# Patient Record
Sex: MALE | Race: BLACK OR AFRICAN AMERICAN | NOT HISPANIC OR LATINO | Employment: FULL TIME | URBAN - METROPOLITAN AREA
[De-identification: names, ages, dates, MRNs, and addresses within clinical notes are randomized per-mention and may not be internally consistent; named-entity substitution may affect disease eponyms.]

---

## 2017-01-06 ENCOUNTER — OFFICE VISIT (OUTPATIENT)
Dept: ORTHOPEDICS | Facility: CLINIC | Age: 32
End: 2017-01-06

## 2017-01-06 VITALS — BODY MASS INDEX: 22.69 KG/M2 | WEIGHT: 162.1 LBS | HEIGHT: 71 IN

## 2017-01-06 DIAGNOSIS — M79.89 SOFT TISSUE MASS: Primary | ICD-10-CM

## 2017-01-06 NOTE — NURSING NOTE
"Reason For Visit:   Chief Complaint   Patient presents with     RECHECK     Pt. states that he is here today for Left Foot Pain. HX: Left Foot Mass Removal in 2013 in [place]                       HEIGHT: 5' 10.5\", WEIGHT: 162 lbs 1.6 oz, BMI: Body mass index is 22.92 kg/(m^2).      No current outpatient prescriptions on file.        No Known Allergies      "

## 2017-01-06 NOTE — PROGRESS NOTES
I was present with the PA during the history and exam.  I discussed the case with the PA and agree with the findings as documented in the assessment and plan.

## 2017-01-06 NOTE — Clinical Note
1/6/2017       RE: Roberto Kay  [address] [address]     Dear Colleague,    Thank you for referring your patient, Roberto Kay, to the [facility] at [facility]. Please see a copy of my visit note below.    I was present with the PA during the history and exam.  I discussed the case with the PA and agree with the findings as documented in the assessment and plan.    CHIEF COMPLAINT:  Left foot pain.      HISTORY OF PRESENT ILLNESS:  Roberto is a 32-year-old male with a history of plantar fibroma.  This was resected once in [place] several years ago and has re-grown back.  He recently met with Dr. Reese in November, 2016 to discuss possible removal.  It was decided he was going to wait and see how he did.  He started having increasing pain and swelling after that visit and developed an infection in the area of the plantar fibroma.  He was seen at another facility and placed on oral antibiotics for 2 weeks.  He states this got rid of his pain and the slight drainage he was having from the wound on the plantar fibroma.  The foot felt very good for several weeks and now he is getting some pain again.  It mostly bothers him at the end of the day.  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  He does not wear any special inserts in his shoes.  He is inquiring about further antibiotics.  He denies any recurrence of redness or drainage from the foot.  No other concerns.      PHYSICAL EXAMINATION:  Roberto is a healthy-appearing 32-year-old  male who is alert and oriented, in no apparent distress.  He is pleasant and cooperative with the exam.  Upon ambulation, he has a nonantalgic reciprocal gait today.  He has a 2 cm plantar fibroma on the plantar surface of the left foot.  There is a small 0.5 cm eschar or blood blister at the proximal end of the fibroma from the previous  infection.  There is no active drainage.  There is no erythema.  He does have some tenderness to deep palpation of the fibroma.  He has full motion of his foot.  He is neurovascularly intact distally.  He has minimal swelling of plantar fibroma today.      IMPRESSION:   1.  A 32-year-old male with left foot plantar fibroma.   2.  Recent infection of his left foot, now resolving.      PLAN:  Roberto seems to be improving from his recent foot infection.  We would not recommend any further antibiotics at this time.  He can participate in activities as tolerated.  If he is feeling throbbing or discomfort at the end of the day, we recommend elevation and rest.  He can also try ibuprofen as needed for his pain and swelling.  We would not recommend surgical excision at this time due to increased risk of infection.  He should wait about a month and see if his symptoms continue to improve.  If at that point he is still having quite a bit of pain, then Dr. Reese would be fine with surgical excision of the plantar fibroma.  Of course, there is a chance that it could recur again, as the patient is well aware of.  The patient agrees with the plan of care.  Dr. Reese examined the patient as well.  All questions were answered.           Again, thank you for allowing me to participate in the care of your patient.      Sincerely,    Adam Reese MD

## 2017-01-06 NOTE — PROGRESS NOTES
CHIEF COMPLAINT:  Left foot pain.      HISTORY OF PRESENT ILLNESS:  Roberto is a 32-year-old male with a history of plantar fibroma.  This was resected once in [place] several years ago and has re-grown back.  He recently met with Dr. Reese in November, 2016 to discuss possible removal.  It was decided he was going to wait and see how he did.  He started having increasing pain and swelling after that visit and developed an infection in the area of the plantar fibroma.  He was seen at another facility and placed on oral antibiotics for 2 weeks.  He states this got rid of his pain and the slight drainage he was having from the wound on the plantar fibroma.  The foot felt very good for several weeks and now he is getting some pain again.  It mostly bothers him at the end of the day.  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  He does not wear any special inserts in his shoes.  He is inquiring about further antibiotics.  He denies any recurrence of redness or drainage from the foot.  No other concerns.      PHYSICAL EXAMINATION:  Roberto is a healthy-appearing 32-year-old  male who is alert and oriented, in no apparent distress.  He is pleasant and cooperative with the exam.  Upon ambulation, he has a nonantalgic reciprocal gait today.  He has a 2 cm plantar fibroma on the plantar surface of the left foot.  There is a small 0.5 cm eschar or blood blister at the proximal end of the fibroma from the previous infection.  There is no active drainage.  There is no erythema.  He does have some tenderness to deep palpation of the fibroma.  He has full motion of his foot.  He is neurovascularly intact distally.  He has minimal swelling of plantar fibroma today.      IMPRESSION:   1.  A 32-year-old male with left foot plantar fibroma.   2.  Recent infection of his left foot, now resolving.      PLAN:  Roberto seems to be improving from his recent foot infection.  We  would not recommend any further antibiotics at this time.  He can participate in activities as tolerated.  If he is feeling throbbing or discomfort at the end of the day, we recommend elevation and rest.  He can also try ibuprofen as needed for his pain and swelling.  We would not recommend surgical excision at this time due to increased risk of infection.  He should wait about a month and see if his symptoms continue to improve.  If at that point he is still having quite a bit of pain, then Dr. Reese would be fine with surgical excision of the plantar fibroma.  Of course, there is a chance that it could recur again, as the patient is well aware of.  The patient agrees with the plan of care.  Dr. Reese examined the patient as well.  All questions were answered.

## 2023-03-23 ENCOUNTER — APPOINTMENT (OUTPATIENT)
Dept: GENERAL RADIOLOGY | Facility: CLINIC | Age: 38
End: 2023-03-23
Attending: EMERGENCY MEDICINE
Payer: COMMERCIAL

## 2023-03-23 ENCOUNTER — HOSPITAL ENCOUNTER (EMERGENCY)
Facility: CLINIC | Age: 38
Discharge: HOME | End: 2023-03-23
Attending: EMERGENCY MEDICINE | Admitting: EMERGENCY MEDICINE
Payer: COMMERCIAL

## 2023-03-23 VITALS
RESPIRATION RATE: 18 BRPM | SYSTOLIC BLOOD PRESSURE: 119 MMHG | TEMPERATURE: 97.7 F | OXYGEN SATURATION: 98 % | HEART RATE: 80 BPM | HEIGHT: 71 IN | BODY MASS INDEX: 26.02 KG/M2 | DIASTOLIC BLOOD PRESSURE: 80 MMHG | WEIGHT: 185.9 LBS

## 2023-03-23 DIAGNOSIS — S52.124A CLOSED NONDISPLACED FRACTURE OF HEAD OF RIGHT RADIUS, INITIAL ENCOUNTER: Primary | ICD-10-CM

## 2023-03-23 PROCEDURE — 73080 X-RAY EXAM OF ELBOW: CPT | Mod: RT

## 2023-03-23 PROCEDURE — 99285 EMERGENCY DEPT VISIT HI MDM: CPT | Mod: 25 | Performed by: EMERGENCY MEDICINE

## 2023-03-23 PROCEDURE — 73110 X-RAY EXAM OF WRIST: CPT | Mod: RT

## 2023-03-23 PROCEDURE — 24650 CLTX RDL HEAD/NCK FX WO MNPJ: CPT | Mod: 54 | Performed by: EMERGENCY MEDICINE

## 2023-03-23 PROCEDURE — 99284 EMERGENCY DEPT VISIT MOD MDM: CPT | Mod: 57 | Performed by: EMERGENCY MEDICINE

## 2023-03-23 PROCEDURE — 24650 CLTX RDL HEAD/NCK FX WO MNPJ: CPT | Mod: RT | Performed by: EMERGENCY MEDICINE

## 2023-03-23 PROCEDURE — 73090 X-RAY EXAM OF FOREARM: CPT | Mod: RT

## 2023-03-23 RX ORDER — NAPROXEN 500 MG/1
500 TABLET ORAL 2 TIMES DAILY WITH MEALS
Qty: 30 TABLET | Refills: 0 | Status: SHIPPED | OUTPATIENT
Start: 2023-03-23 | End: 2023-04-07

## 2023-03-23 RX ORDER — IBUPROFEN 600 MG/1
600 TABLET, FILM COATED ORAL ONCE
Status: COMPLETED | OUTPATIENT
Start: 2023-03-23 | End: 2023-03-23

## 2023-03-23 ASSESSMENT — ACTIVITIES OF DAILY LIVING (ADL): ADLS_ACUITY_SCORE: 33

## 2023-03-23 NOTE — DISCHARGE INSTRUCTIONS
A referral for orthopedic surgery has been made for you.  They will contact you to set up an appointment.

## 2023-03-23 NOTE — ED PROVIDER NOTES
"    [facility] EMERGENCY DEPARTMENT ([facility])     March 23, 2023  ED Provider Note  [facility]      History     Chief Complaint   Patient presents with     Fall     Patient had a fall while walking yesterday, and hit the curb. His right elbow and wrist hurts. Denies hitting his head. No loss of consciousness.      Arm Injury     HPI  Roberto Kay is a 38 year old male who presents to the Emergency Department for evaluation of fall. Patient reports right elbow and wrist pain after falling yesterday while walking on the curb. He state he finds it difficult to move his arm. No other complaints.     Past Medical History  No past medical history on file.  No past surgical history on file.  naproxen (NAPROSYN) 500 MG tablet      No Known Allergies  Family History  No family history on file.  Social History   Social History     Tobacco Use     Smoking status: Never   Substance Use Topics     Alcohol use: No     Alcohol/week: 0.0 standard drinks     Drug use: No      Past medical history, past surgical history, medications, allergies, family history, and social history were reviewed with the patient. No additional pertinent items.      A medically appropriate review of systems was performed with pertinent positives and negatives noted in the HPI, and all other systems negative.    Physical Exam   BP: 119/80  Pulse: 80  Temp: 97.7  F (36.5  C)  Resp: 18  Height: 181 cm (5' 11.26\")  Weight: 84.3 kg (185 lb 14.4 oz)  SpO2: 98 %  Physical Exam  Vitals and nursing note reviewed.   Constitutional:       General: He is not in acute distress.     Appearance: He is well-developed.   HENT:      Head: Normocephalic.      Right Ear: External ear normal.      Left Ear: External ear normal.      Nose: Nose normal.   Eyes:      Extraocular Movements: Extraocular movements intact.      Conjunctiva/sclera: Conjunctivae normal.   Pulmonary:      Effort: Pulmonary effort is normal. No respiratory " distress.   Abdominal:      General: Abdomen is flat. There is no distension.   Musculoskeletal:         General: No deformity. Normal range of motion.      Cervical back: Normal range of motion and neck supple.      Comments: Tenderness in the area of the right radial head as well as right olecranon.  Limited range of motion elbow flexion extension as well as supination pronation.   Skin:     General: Skin is warm and dry.   Neurological:      Mental Status: He is alert. Mental status is at baseline.      Comments: Oriented   Psychiatric:         Mood and Affect: Mood normal.         Behavior: Behavior normal.           ED Course, Procedures, & Data      Procedures        12:36 PM  The patient was seen and examined by Roni Brannon DO in Room [facility].                 Results for orders placed or performed during the hospital encounter of 03/23/23   XR Wrist Right 3 Views     Status: None    Narrative    Examination:  XR WRIST RIGHT G/E 3 VIEWS    Date:  3/23/2023 1:41 PM     Clinical Information: Fall, pain    Comparison: none.      Impression    Impression:    1.  Right wrist negative for fracture or dislocation.     2.  The wrist is positioned in pronation, resulting in alignment of  the proximal ulna involving the extreme posterior margin of the distal  radius at the DRUJ. If the patient is focally symptomatic in this area  (such as with DRUJ instability or symptomatology), CT could confirm  joint alignment is within normal limits.    KATIE VAUGHAN MD         SYSTEM ID:  [id number]   Radius/Ulna XR, PA & LAT, right     Status: None    Narrative    Examination:  XR FOREARM RIGHT 2 VIEWS    Date:  3/23/2023 1:44 PM     Clinical Information: fall, pain    Comparison: none.      Impression    Impression:    1.  Acute fractures of the proximal radius with intra-articular  extension to the elbow joint. No significant bony deformity. Large  elbow joint effusion. Elbow joint alignment is normal.    2.   Otherwise normal right forearm and wrist.    KATIE VAUGHAN MD         SYSTEM ID:  [id number]   XR Elbow Right G/E 3 Views     Status: None    Narrative    Examination:  XR ELBOW RIGHT G/E 3 VIEWS    Date:  3/23/2023 1:43 PM     Clinical Information: Fall, pain    Comparison: none.      Impression    Impression:    1.  Acute nondisplaced fracture through the radial head, without  significant bony deformity or step-off.    2.  Large elbow joint effusion.     3.  Normal joint alignment.    KATIE VAUGHAN MD         SYSTEM ID:  [id number]     Medications   ibuprofen (ADVIL/MOTRIN) tablet 600 mg (600 mg Oral Not Given 3/23/23 1254)     Labs Ordered and Resulted from Time of ED Arrival to Time of ED Departure - No data to display  Radius/Ulna XR, PA & LAT, right   Final Result   Impression:      1.  Acute fractures of the proximal radius with intra-articular   extension to the elbow joint. No significant bony deformity. Large   elbow joint effusion. Elbow joint alignment is normal.      2.  Otherwise normal right forearm and wrist.      KATIE VAUGHAN MD            SYSTEM ID:  [id number]      XR Elbow Right G/E 3 Views   Final Result   Impression:      1.  Acute nondisplaced fracture through the radial head, without   significant bony deformity or step-off.      2.  Large elbow joint effusion.       3.  Normal joint alignment.      KATIE VAUGHAN MD            SYSTEM ID:  [id number]      XR Wrist Right 3 Views   Final Result   Impression:      1.  Right wrist negative for fracture or dislocation.       2.  The wrist is positioned in pronation, resulting in alignment of   the proximal ulna involving the extreme posterior margin of the distal   radius at the DRUJ. If the patient is focally symptomatic in this area   (such as with DRUJ instability or symptomatology), CT could confirm   joint alignment is within normal limits.      KATIE VAUGHAN MD            SYSTEM ID:  [id number]              Medical Decision Making  The patient's presentation is strongly suggestive of moderate complexity (an acute complicated injury).    The patient's evaluation involved:  review of external note(s) from 3+ sources (Most recent H&P in addition to clinic and ED note)  Reviewed x-ray results here  Independently interpreted x-rays done today    The patient's management involved moderate risk (prescription drug management including medications given in the ED).      Assessment & Plan    38-year-old male presents to us with a chief complaint of elbow and wrist pain.  He does have a radial head fracture apparent on x-ray.  We will give him pain medication as well as place him in a sling for for 2 to 3 days.  He is instructed to remove it after that.  Referral for orthopedic surgery has been made for him.  He will follow-up with them.    I have reviewed the nursing notes. I have reviewed the findings, diagnosis, plan and need for follow up with the patient.    Discharge Medication List as of 3/23/2023  2:51 PM      START taking these medications    Details   naproxen (NAPROSYN) 500 MG tablet Take 1 tablet (500 mg) by mouth 2 times daily (with meals) for 15 days, Disp-30 tablet, R-0, E-Prescribe             Final diagnoses:   Closed nondisplaced fracture of head of right radius, initial encounter   I, Rashida Hopkins, am serving as a trained medical scribe to document services personally performed by  Roni Brannon DO, based on the provider's statements to me.      IRoni DO, was physically present and have reviewed and verified the accuracy of this note documented by Rashida Hopkins.     Roni Brannon DO  formerly [facility] EMERGENCY DEPARTMENT  3/23/2023     Roni Brannon DO  03/23/23 [id number]

## 2023-03-23 NOTE — ED TRIAGE NOTES
Patient states to have fallen yesterday while walking on the curb, hurt his right elbow and wrist, difficulty moving his arm.      Triage Assessment     Row Name 03/23/23 1217       Triage Assessment (Adult)    Airway WDL WDL       Respiratory WDL    Respiratory WDL WDL       Skin Circulation/Temperature WDL    Skin Circulation/Temperature WDL WDL       Cardiac WDL    Cardiac WDL WDL       Peripheral/Neurovascular WDL    Peripheral Neurovascular WDL WDL       Cognitive/Neuro/Behavioral WDL    Cognitive/Neuro/Behavioral WDL WDL

## 2023-03-24 NOTE — TELEPHONE ENCOUNTER
DIAGNOSIS: Closed nondisplaced fracture of head of right radius   APPOINTMENT DATE: 03/27/2023   NOTES STATUS DETAILS   OFFICE NOTE from referring provider N/A    OFFICE NOTE from other specialist N/A    DISCHARGE SUMMARY from hospital N/A    DISCHARGE REPORT from the ER Internal 03/23/2023 [facility] ED    OPERATIVE REPORT N/A    EMG report N/A    MEDICATION LIST N/A    MRI N/A    DEXA (osteoporosis/bone health) N/A    CT SCAN N/A    XRAYS (IMAGES & REPORTS) Internal 03/23/2023 RT wrist, elbow forearm

## 2023-03-27 ENCOUNTER — OFFICE VISIT (OUTPATIENT)
Dept: ORTHOPEDICS | Facility: CLINIC | Age: 38
End: 2023-03-27
Attending: EMERGENCY MEDICINE
Payer: COMMERCIAL

## 2023-03-27 ENCOUNTER — PRE VISIT (OUTPATIENT)
Dept: ORTHOPEDICS | Facility: CLINIC | Age: 38
End: 2023-03-27

## 2023-03-27 DIAGNOSIS — S52.124A CLOSED NONDISPLACED FRACTURE OF HEAD OF RIGHT RADIUS, INITIAL ENCOUNTER: ICD-10-CM

## 2023-03-27 PROCEDURE — 99203 OFFICE O/P NEW LOW 30 MIN: CPT | Performed by: FAMILY MEDICINE

## 2023-03-27 ASSESSMENT — PAIN SCALES - GENERAL: PAINLEVEL: MODERATE PAIN (4)

## 2023-03-27 NOTE — LETTER
3/27/2023         RE: Roberto Kay  [id number]  [facility] [place] [address]        Dear Colleague,    Thank you for referring your patient, Roberto Kay, to the [facility] SPORTS MEDICINE CLINIC [place]. Please see a copy of my visit note below.    CHIEF COMPLAINT:  New Patient and Pain of the Right Forearm       HISTORY OF PRESENT ILLNESS  Mr. Kay is a pleasant 38 year old male who presents to clinic today with a right elbow injury.  Today's visit is conducted with assistance of a [facility] .  Roberto suffered a fall after slipping, this was on March 21.  He was seen at the emergency department 2 days later where he was diagnosed with a radial head fracture.  He is feeling quite a bit better, his range of motion has improved.  He has been wearing a sling at most times since the incident.        Additional history: as documented    MEDICAL HISTORY  There is no problem list on file for this patient.      Current Outpatient Medications   Medication Sig Dispense Refill     naproxen (NAPROSYN) 500 MG tablet Take 1 tablet (500 mg) by mouth 2 times daily (with meals) for 15 days 30 tablet 0       No Known Allergies    No family history on file.    Additional medical/Social/Surgical histories reviewed in [facility] and updated as appropriate.        PHYSICAL EXAM  General  - normal appearance, in no obvious distress    Musculoskeletal - right elbow  - inspection: normal joint alignment, no obvious deformity, no swelling  - palpation: no bony or soft tissue tenderness with light touch  - ROM: Restricted flexion and extension, no limitation with pronation or supination  - no sensory or motor deficit, grossly normal coordination, normal muscle tone               ASSESSMENT & PLAN  Mr. Kay is a 38 year old male who presents to clinic today with a right elbow fracture.    I reviewed his x-ray in the room with him, this does reveal a nondisplaced radial neck fracture.    We  did provide him with a different sling for comfort, he can come out of the sling to do range of motion based activities starting next week.  We should follow-up in 2 weeks with a repeat x-ray.    It was a pleasure seeing Roberto today.    Miguel Barrientos DO, [facility]  Primary Care Sports Medicine      This note was constructed using Dragon dictation software, please excuse any minor errors in spelling, grammar, or syntax.        Again, thank you for allowing me to participate in the care of your patient.        Sincerely,        Miguel Barrientos DO

## 2023-03-27 NOTE — PROGRESS NOTES
CHIEF COMPLAINT:  New Patient and Pain of the Right Forearm       HISTORY OF PRESENT ILLNESS  Mr. Kay is a pleasant 38 year old male who presents to clinic today with a right elbow injury.  Today's visit is conducted with assistance of a [facility] .  Roberto suffered a fall after slipping, this was on March 21.  He was seen at the emergency department 2 days later where he was diagnosed with a radial head fracture.  He is feeling quite a bit better, his range of motion has improved.  He has been wearing a sling at most times since the incident.        Additional history: as documented    MEDICAL HISTORY  There is no problem list on file for this patient.      Current Outpatient Medications   Medication Sig Dispense Refill     naproxen (NAPROSYN) 500 MG tablet Take 1 tablet (500 mg) by mouth 2 times daily (with meals) for 15 days 30 tablet 0       No Known Allergies    No family history on file.    Additional medical/Social/Surgical histories reviewed in [facility] and updated as appropriate.        PHYSICAL EXAM  General  - normal appearance, in no obvious distress    Musculoskeletal - right elbow  - inspection: normal joint alignment, no obvious deformity, no swelling  - palpation: no bony or soft tissue tenderness with light touch  - ROM: Restricted flexion and extension, no limitation with pronation or supination  - no sensory or motor deficit, grossly normal coordination, normal muscle tone               ASSESSMENT & PLAN  Mr. Kay is a 38 year old male who presents to clinic today with a right elbow fracture.    I reviewed his x-ray in the room with him, this does reveal a nondisplaced radial neck fracture.    We did provide him with a different sling for comfort, he can come out of the sling to do range of motion based activities starting next week.  We should follow-up in 2 weeks with a repeat x-ray.    It was a pleasure seeing Roberto today.    Miguel Barrientos DO, [facility]  Primary Care Sports  Medicine      This note was constructed using Dragon dictation software, please excuse any minor errors in spelling, grammar, or syntax.

## 2023-03-27 NOTE — NURSING NOTE
Chief Complaint   Patient presents with     Right Forearm - New Patient, Pain       There were no vitals filed for this visit.    There is no height or weight on file to calculate BMI.      JOSEF Horowitz NREMT

## 2023-04-14 DIAGNOSIS — S52.124A CLOSED NONDISPLACED FRACTURE OF HEAD OF RIGHT RADIUS, INITIAL ENCOUNTER: Primary | ICD-10-CM

## 2023-04-19 ENCOUNTER — OFFICE VISIT (OUTPATIENT)
Dept: ORTHOPEDICS | Facility: CLINIC | Age: 38
End: 2023-04-19
Payer: COMMERCIAL

## 2023-04-19 ENCOUNTER — ANCILLARY PROCEDURE (OUTPATIENT)
Dept: GENERAL RADIOLOGY | Facility: CLINIC | Age: 38
End: 2023-04-19
Attending: FAMILY MEDICINE
Payer: COMMERCIAL

## 2023-04-19 DIAGNOSIS — S52.124A CLOSED NONDISPLACED FRACTURE OF HEAD OF RIGHT RADIUS, INITIAL ENCOUNTER: ICD-10-CM

## 2023-04-19 DIAGNOSIS — S52.124D CLOSED NONDISPLACED FRACTURE OF HEAD OF RIGHT RADIUS WITH ROUTINE HEALING, SUBSEQUENT ENCOUNTER: Primary | ICD-10-CM

## 2023-04-19 PROCEDURE — 99213 OFFICE O/P EST LOW 20 MIN: CPT | Performed by: FAMILY MEDICINE

## 2023-04-19 PROCEDURE — 73080 X-RAY EXAM OF ELBOW: CPT | Mod: RT | Performed by: RADIOLOGY

## 2023-04-19 NOTE — PROGRESS NOTES
Subjective:   Roberto Kay is a 38 year old male who presents to clinic for follow-up of a right elbow fracture. He reports he had an injury on 3/21/23, he fell while walking and fell backwards hiting a curb. He was seen in the ED on 3/23/23. Where they obtained XR and revealed a radial head fracture. He is right hand dominant. He is doing quite a bit better today.  He has mild pain and some loss of range of motion, but is otherwise doing much better than he was at his previous visit.    Background:   Date of injury: 3/21/23   Duration of symptoms: 3 weeks  Mechanism of Injury: Acute; Activity Related fall  Intensity: 3/10 at rest, 3/10 with activity   Aggravating factors: pronation and supination, lifting  Relieving Factors: rest  Prior Evaluation: Prior Physician Evalutation: ED 3/23/23    ALLERGIES: He has No Known Allergies.    CURRENT MEDICATIONS: He currently has no medications in their medication list.        Exam:   General  - normal appearance, in no obvious distress    Musculoskeletal - right elbow  - inspection: normal joint alignment, no obvious deformity, no swelling  - palpation: no bony or soft tissue tenderness  - ROM: Loss of 10 degrees of extension  - strength: 5/5  strength  Neuro  - no sensory or motor deficit, grossly normal coordination, normal muscle tone         Assessment/Plan:   Mr. Kay is a 38-year-old gentleman following up with a radial head fracture.    I ordered and independently reviewed an x-ray of his right elbow, this redemonstrates his fracture with some evidence of healing.    At this point I do think would be reasonable for him to stay out of the sling, focusing on range of motion and strengthening moving forward.    I also recommend not carrying more than 10 pounds for the next 2 weeks.    If he continues to do well we can follow-up as needed for this and other issues.    It was a pleasure seeing Roberto.      Frank Barrientos, DO [facility]

## 2023-04-19 NOTE — LETTER
4/19/2023      RE: Roberto Kay  [id number]  [facility] [address]     Dear Colleague,    Thank you for referring your patient, Roberto Kay, to the [facility] [place]. Please see a copy of my visit note below.     Subjective:   Roberto Kay is a 38 year old male who presents to clinic for follow-up of a right elbow fracture. He reports he had an injury on 3/21/23, he fell while walking and fell backwards hiting a curb. He was seen in the ED on 3/23/23. Where they obtained XR and revealed a radial head fracture. He is right hand dominant. He is doing quite a bit better today.  He has mild pain and some loss of range of motion, but is otherwise doing much better than he was at his previous visit.    Background:   Date of injury: 3/21/23   Duration of symptoms: 3 weeks  Mechanism of Injury: Acute; Activity Related fall  Intensity: 3/10 at rest, 3/10 with activity   Aggravating factors: pronation and supination, lifting  Relieving Factors: rest  Prior Evaluation: Prior Physician Evalutation: ED 3/23/23    ALLERGIES: He has No Known Allergies.    CURRENT MEDICATIONS: He currently has no medications in their medication list.        Exam:   General  - normal appearance, in no obvious distress    Musculoskeletal - right elbow  - inspection: normal joint alignment, no obvious deformity, no swelling  - palpation: no bony or soft tissue tenderness  - ROM: Loss of 10 degrees of extension  - strength: 5/5  strength  Neuro  - no sensory or motor deficit, grossly normal coordination, normal muscle tone         Assessment/Plan:   Mr. Kay is a 38-year-old gentleman following up with a radial head fracture.    I ordered and independently reviewed an x-ray of his right elbow, this redemonstrates his fracture with some evidence of healing.    At this point I do think would be reasonable for him to stay out of the sling, focusing on range of motion and  strengthening moving forward.    I also recommend not carrying more than 10 pounds for the next 2 weeks.    If he continues to do well we can follow-up as needed for this and other issues.    It was a pleasure seeing Roberto.      Frank Barrientos, DO [facility]        Again, thank you for allowing me to participate in the care of your patient.      Sincerely,    Miguel Barrientos, DO